# Patient Record
Sex: MALE | Race: WHITE | NOT HISPANIC OR LATINO | Employment: FULL TIME | URBAN - METROPOLITAN AREA
[De-identification: names, ages, dates, MRNs, and addresses within clinical notes are randomized per-mention and may not be internally consistent; named-entity substitution may affect disease eponyms.]

---

## 2017-01-19 ENCOUNTER — HOSPITAL ENCOUNTER (EMERGENCY)
Facility: MEDICAL CENTER | Age: 32
End: 2017-01-19
Attending: EMERGENCY MEDICINE

## 2017-01-19 ENCOUNTER — APPOINTMENT (OUTPATIENT)
Dept: RADIOLOGY | Facility: MEDICAL CENTER | Age: 32
End: 2017-01-19
Attending: EMERGENCY MEDICINE

## 2017-01-19 VITALS
RESPIRATION RATE: 16 BRPM | DIASTOLIC BLOOD PRESSURE: 74 MMHG | BODY MASS INDEX: 23.75 KG/M2 | TEMPERATURE: 99 F | SYSTOLIC BLOOD PRESSURE: 113 MMHG | WEIGHT: 179.23 LBS | OXYGEN SATURATION: 96 % | HEART RATE: 104 BPM | HEIGHT: 73 IN

## 2017-01-19 DIAGNOSIS — N45.1 EPIDIDYMITIS: ICD-10-CM

## 2017-01-19 LAB
ALBUMIN SERPL BCP-MCNC: 4 G/DL (ref 3.2–4.9)
ALBUMIN/GLOB SERPL: 1.4 G/DL
ALP SERPL-CCNC: 79 U/L (ref 30–99)
ALT SERPL-CCNC: 14 U/L (ref 2–50)
ANION GAP SERPL CALC-SCNC: 5 MMOL/L (ref 0–11.9)
APPEARANCE UR: ABNORMAL
AST SERPL-CCNC: 14 U/L (ref 12–45)
BASOPHILS # BLD AUTO: 0.4 % (ref 0–1.8)
BASOPHILS # BLD: 0.04 K/UL (ref 0–0.12)
BILIRUB SERPL-MCNC: 0.6 MG/DL (ref 0.1–1.5)
BUN SERPL-MCNC: 17 MG/DL (ref 8–22)
CALCIUM SERPL-MCNC: 9 MG/DL (ref 8.5–10.5)
CHLORIDE SERPL-SCNC: 104 MMOL/L (ref 96–112)
CO2 SERPL-SCNC: 26 MMOL/L (ref 20–33)
COLOR UR AUTO: YELLOW
CREAT SERPL-MCNC: 0.83 MG/DL (ref 0.5–1.4)
EOSINOPHIL # BLD AUTO: 0.16 K/UL (ref 0–0.51)
EOSINOPHIL NFR BLD: 1.6 % (ref 0–6.9)
ERYTHROCYTE [DISTWIDTH] IN BLOOD BY AUTOMATED COUNT: 41.9 FL (ref 35.9–50)
GFR SERPL CREATININE-BSD FRML MDRD: >60 ML/MIN/1.73 M 2
GLOBULIN SER CALC-MCNC: 2.9 G/DL (ref 1.9–3.5)
GLUCOSE SERPL-MCNC: 91 MG/DL (ref 65–99)
GLUCOSE UR QL STRIP.AUTO: NEGATIVE MG/DL
HCT VFR BLD AUTO: 43.6 % (ref 42–52)
HGB BLD-MCNC: 14.6 G/DL (ref 14–18)
IMM GRANULOCYTES # BLD AUTO: 0.03 K/UL (ref 0–0.11)
IMM GRANULOCYTES NFR BLD AUTO: 0.3 % (ref 0–0.9)
KETONES UR QL STRIP.AUTO: ABNORMAL MG/DL
LEUKOCYTE ESTERASE UR QL STRIP.AUTO: ABNORMAL
LIPASE SERPL-CCNC: 22 U/L (ref 11–82)
LYMPHOCYTES # BLD AUTO: 1.32 K/UL (ref 1–4.8)
LYMPHOCYTES NFR BLD: 13.1 % (ref 22–41)
MCH RBC QN AUTO: 30.2 PG (ref 27–33)
MCHC RBC AUTO-ENTMCNC: 33.5 G/DL (ref 33.7–35.3)
MCV RBC AUTO: 90.3 FL (ref 81.4–97.8)
MONOCYTES # BLD AUTO: 0.98 K/UL (ref 0–0.85)
MONOCYTES NFR BLD AUTO: 9.8 % (ref 0–13.4)
NEUTROPHILS # BLD AUTO: 7.52 K/UL (ref 1.82–7.42)
NEUTROPHILS NFR BLD: 74.8 % (ref 44–72)
NITRITE UR QL STRIP.AUTO: POSITIVE
NRBC # BLD AUTO: 0 K/UL
NRBC BLD AUTO-RTO: 0 /100 WBC
PH UR STRIP.AUTO: 7 [PH]
PLATELET # BLD AUTO: 240 K/UL (ref 164–446)
PMV BLD AUTO: 9.7 FL (ref 9–12.9)
POTASSIUM SERPL-SCNC: 4.2 MMOL/L (ref 3.6–5.5)
PROT SERPL-MCNC: 6.9 G/DL (ref 6–8.2)
PROT UR QL STRIP: ABNORMAL MG/DL
RBC # BLD AUTO: 4.83 M/UL (ref 4.7–6.1)
RBC UR QL AUTO: ABNORMAL
SODIUM SERPL-SCNC: 135 MMOL/L (ref 135–145)
SP GR UR: 1.02
WBC # BLD AUTO: 10.1 K/UL (ref 4.8–10.8)

## 2017-01-19 PROCEDURE — 700102 HCHG RX REV CODE 250 W/ 637 OVERRIDE(OP): Performed by: EMERGENCY MEDICINE

## 2017-01-19 PROCEDURE — 76870 US EXAM SCROTUM: CPT

## 2017-01-19 PROCEDURE — 81002 URINALYSIS NONAUTO W/O SCOPE: CPT

## 2017-01-19 PROCEDURE — 36415 COLL VENOUS BLD VENIPUNCTURE: CPT

## 2017-01-19 PROCEDURE — 700111 HCHG RX REV CODE 636 W/ 250 OVERRIDE (IP): Performed by: EMERGENCY MEDICINE

## 2017-01-19 PROCEDURE — 700101 HCHG RX REV CODE 250: Performed by: EMERGENCY MEDICINE

## 2017-01-19 PROCEDURE — 99284 EMERGENCY DEPT VISIT MOD MDM: CPT

## 2017-01-19 PROCEDURE — 85025 COMPLETE CBC W/AUTO DIFF WBC: CPT

## 2017-01-19 PROCEDURE — 87186 SC STD MICRODIL/AGAR DIL: CPT

## 2017-01-19 PROCEDURE — A9270 NON-COVERED ITEM OR SERVICE: HCPCS | Performed by: EMERGENCY MEDICINE

## 2017-01-19 PROCEDURE — 80053 COMPREHEN METABOLIC PANEL: CPT

## 2017-01-19 PROCEDURE — 83690 ASSAY OF LIPASE: CPT

## 2017-01-19 PROCEDURE — 87086 URINE CULTURE/COLONY COUNT: CPT

## 2017-01-19 PROCEDURE — 96372 THER/PROPH/DIAG INJ SC/IM: CPT

## 2017-01-19 PROCEDURE — 87077 CULTURE AEROBIC IDENTIFY: CPT

## 2017-01-19 RX ORDER — HYDROCODONE BITARTRATE AND ACETAMINOPHEN 7.5; 325 MG/1; MG/1
1 TABLET ORAL EVERY 6 HOURS PRN
Qty: 20 TAB | Refills: 0 | Status: SHIPPED | OUTPATIENT
Start: 2017-01-19

## 2017-01-19 RX ORDER — DOXYCYCLINE 100 MG/1
100 TABLET ORAL ONCE
Status: COMPLETED | OUTPATIENT
Start: 2017-01-19 | End: 2017-01-19

## 2017-01-19 RX ORDER — CEFTRIAXONE SODIUM 250 MG/1
250 INJECTION, POWDER, FOR SOLUTION INTRAMUSCULAR; INTRAVENOUS ONCE
Status: COMPLETED | OUTPATIENT
Start: 2017-01-19 | End: 2017-01-19

## 2017-01-19 RX ORDER — IBUPROFEN 600 MG/1
600 TABLET ORAL ONCE
Status: COMPLETED | OUTPATIENT
Start: 2017-01-19 | End: 2017-01-19

## 2017-01-19 RX ORDER — HYDROCODONE BITARTRATE AND ACETAMINOPHEN 7.5; 325 MG/1; MG/1
1 TABLET ORAL ONCE
Status: COMPLETED | OUTPATIENT
Start: 2017-01-19 | End: 2017-01-19

## 2017-01-19 RX ORDER — DOXYCYCLINE HYCLATE 100 MG
100 TABLET ORAL 2 TIMES DAILY
Qty: 20 TAB | Refills: 0 | Status: SHIPPED | OUTPATIENT
Start: 2017-01-19

## 2017-01-19 RX ORDER — LIDOCAINE HYDROCHLORIDE 10 MG/ML
1 INJECTION, SOLUTION INFILTRATION; PERINEURAL ONCE
Status: COMPLETED | OUTPATIENT
Start: 2017-01-19 | End: 2017-01-19

## 2017-01-19 RX ADMIN — IBUPROFEN 600 MG: 600 TABLET, FILM COATED ORAL at 16:57

## 2017-01-19 RX ADMIN — DOXYCYCLINE 100 MG: 100 TABLET ORAL at 16:57

## 2017-01-19 RX ADMIN — HYDROCODONE BITARTRATE AND ACETAMINOPHEN 1 TABLET: 7.5; 325 TABLET ORAL at 16:57

## 2017-01-19 RX ADMIN — LIDOCAINE HYDROCHLORIDE 1 ML: 10 INJECTION, SOLUTION INFILTRATION; PERINEURAL at 18:16

## 2017-01-19 RX ADMIN — CEFTRIAXONE SODIUM 250 MG: 250 INJECTION, POWDER, FOR SOLUTION INTRAMUSCULAR; INTRAVENOUS at 18:16

## 2017-01-19 ASSESSMENT — PAIN SCALES - GENERAL
PAINLEVEL_OUTOF10: 8
PAINLEVEL_OUTOF10: 5
PAINLEVEL_OUTOF10: 3

## 2017-01-19 NOTE — ED AVS SNAPSHOT
Becual Access Code: [email or web address]  Expires: 2/18/2017  7:19 PM    Your email address is not on file at [facility].  Email Addresses are required for you to sign up for Becual, please contact [phone] to verify your personal information and to provide your email address prior to attempting to register for Becual.    Belgica Norton  [address] [place] [id number]    Becual  A secure, online tool to manage your health information     [facility]’s Becual® is a secure, online tool that connects you to your personalized health information from the privacy of your home -- day or night - making it very easy for you to manage your healthcare. Once the activation process is completed, you can even access your medical information using the Becual jacklyn, which is available for free in the Apple Jacklyn store or Google Play store.     To learn more about Becual, visit [email or web address]    There are two levels of access available (as shown below):   My Chart Features  [facility] Primary Care Doctor [facility]  Specialists [facility]  Urgent  Care Non-[facility] Primary Care Doctor   Email your healthcare team securely and privately 24/7 X X X    Manage appointments: schedule your next appointment; view details of past/upcoming appointments X      Request prescription refills. X      View recent personal medical records, including lab and immunizations X X X X   View health record, including health history, allergies, medications X X X X   Read reports about your outpatient visits, procedures, consult and ER notes X X X X   See your discharge summary, which is a recap of your hospital and/or ER visit that includes your diagnosis, lab results, and care plan X X  X     How to register for Becual:  Once your e-mail address has been verified, follow the following steps to sign up for Becual.     1. Go to  [email or web address]  2. Click on the Sign Up Now box, which takes you to the New Member Sign Up page. You  will need to provide the following information:  a. Enter your Dekko Access Code exactly as it appears at the top of this page. (You will not need to use this code after you’ve completed the sign-up process. If you do not sign up before the expiration date, you must request a new code.)   b. Enter your date of birth.   c. Enter your home email address.   d. Click Submit, and follow the next screen’s instructions.  3. Create a HealthcareSourcet ID. This will be your Dekko login ID and cannot be changed, so think of one that is secure and easy to remember.  4. Create a Dekko password. You can change your password at any time.  5. Enter your Password Reset Question and Answer. This can be used at a later time if you forget your password.   6. Enter your e-mail address. This allows you to receive e-mail notifications when new information is available in Dekko.  7. Click Sign Up. You can now view your health information.    For assistance activating your Dekko account, call [phone]

## 2017-01-19 NOTE — ED AVS SNAPSHOT
After Visit Summary                                                                                                                Belgica Norton   MRN: [id number]    Department:  [facility], Emergency Dept   Date of Visit:  1/19/2017            [facility], Emergency Dept    [address]    [place] [address]    Phone:  [phone]      You were seen by     1. José Miguel Hodges M.D.    2. Jermaine Slater M.D.      Your Diagnosis Was     Epididymitis     N45.1       These are the medications you received during your hospitalization from 01/19/2017 1508 to 01/19/2017 1919     Date/Time Order Dose Route Action    01/19/2017 1657 doxycycline monohydrate (ADOXA) tablet 100 mg 100 mg Oral Given    01/19/2017 1657 hydrocodone-acetaminophen (NORCO) 7.5-325 MG per tablet 1 Tab 1 Tab Oral Given    01/19/2017 1657 ibuprofen (MOTRIN) tablet 600 mg 600 mg Oral Given    01/19/2017 1816 cefTRIAXone (ROCEPHIN) injection 250 mg 250 mg Intramuscular Given    01/19/2017 1816 lidocaine (XYLOCAINE) 1 % injection 1 mL Other Given      Follow-up Information     1. Call Romeo Guzman M.D..    Specialty:  Urology    Why:  As needed    Contact information    [address]  [facility] [address] [phone]        Medication Information     Review all of your home medications and newly ordered medications with your primary doctor and/or pharmacist as soon as possible. Follow medication instructions as directed by your doctor and/or pharmacist.     Please keep your complete medication list with you and share with your physician. Update the information when medications are discontinued, doses are changed, or new medications (including over-the-counter products) are added; and carry medication information at all times in the event of emergency situations.               Medication List      START taking these medications        Instructions    doxycycline 100 MG Tabs   Commonly known as:   VIBRAMYCIN    Take 1 Tab by mouth 2 times a day.   Dose:  100 mg       hydrocodone-acetaminophen 7.5-325 MG per tablet   Commonly known as:  NORCO    Take 1 Tab by mouth every 6 hours as needed.   Dose:  1 Tab               Procedures and tests performed during your visit     CBC WITH DIFFERENTIAL    COMP METABOLIC PANEL    ESTIMATED GFR    LIPASE    POC UA    URINE CULTURE(NEW)    [id number]        Discharge Instructions       Epididymitis  Epididymitis is swelling (inflammation) of the epididymis. The epididymis is a cord-like structure that is located along the back part of the testicle. Epididymitis is usually, but not always, caused by infection. This is usually a sudden problem that begins with chills, fever, and pain behind the scrotum and in the testicle. There may be swelling and redness of the testicle.  CAUSES  · STDs (sexually transmitted diseases).  ¨ Gonorrhea.  ¨ Chlamydia.  · Other contagious diseases, such as tuberculosis.  · Bladder outlet obstruction.  RISK FACTORS  · Having unprotected sex.  · Having anal sex.  · Having had a prostate biopsy.  · Having had an instrument inserted into the urinary tract, such as a urinary catheter.  · Having a suppressed immune system.  DIAGNOSIS  Your health care provider may use any of the following methods to diagnose epididymitis:  · A physical exam.  · An ultrasound-guided biopsy.  · A urine test for infections, such as STDs.  Your health care provider may test you for other STDs, including HIV (human immunodeficiency virus).  TREATMENT  Antibiotic medicine may be prescribed for epididymitis that is caused by an infection. Severe cases may require surgery.  HOME CARE INSTRUCTIONS  · To help relieve pain, take hot sitz baths for 20 minutes, 4 times per day.  · If your epididymitis was caused by an STD, avoid sexual activity until your treatment is complete.  · If you test positive for an STD, inform your sexual partners. They may need to be  treated.  · Take medicines only as directed by your health care provider. These include over-the-counter medicines and prescription medicines for pain, discomfort, or fever.  · Take your antibiotic medicine as directed by your health care provider. Finish the antibiotic even if you start to feel better.  · Keep all follow-up visits as directed by your health care provider. This is important.  SEEK IMMEDIATE MEDICAL CARE IF:  · You have a fever.  · Your pain medicine is not helping.  · Your pain is getting worse.  · Your pain seems to come and go.  · You develop pain, redness, and swelling in the scrotum or the areas around it.     This information is not intended to replace advice given to you by your health care provider. Make sure you discuss any questions you have with your health care provider.     Document Released: 12/15/2001 Document Revised: 01/08/2016 Document Reviewed: 11/04/2010  Guavus Interactive Patient Education ©2016 [employer]            Patient Information     Patient Information    Following emergency treatment: all patient requiring follow-up care must return either to a private physician or a clinic if your condition worsens before you are able to obtain further medical attention, please return to the emergency room.     Billing Information    At [facility], we work to make the billing process streamlined for our patients.  Our Representatives are here to answer any questions you may have regarding your hospital bill.  If you have insurance coverage and have supplied your insurance information to us, we will submit a claim to your insurer on your behalf.  Should you have any questions regarding your bill, we can be reached online or by phone as follows:  Online: You are able pay your bills online or live chat with our representatives about any billing questions you may have. We are here to help Monday - Friday from 8:00am to 7:30pm and 9:00am - 12:00pm on Saturdays.  Please visit  [email or web address]  for more information.   Phone:  [phone] or [phone]    Please note that your emergency physician, surgeon, pathologist, radiologist, anesthesiologist, and other specialists are not employed by [facility] and will therefore bill separately for their services.  Please contact them directly for any questions concerning their bills at the numbers below:     Emergency Physician Services:  [phone]  [facility]:  [phone]  [facility]:  [phone]  [facility]:  [phone]    1. Your final bill may vary from the amount quoted upon discharge if all procedures are not complete at that time, or if your doctor has additional procedures of which we are not aware. You will receive an additional bill if you return to the Emergency Department at [facility] for suture removal regardless of the facility of which the sutures were placed.     2. Please arrange for settlement of this account at the emergency registration.    3. All self-pay accounts are due in full at the time of treatment.  If you are unable to meet this obligation then payment is expected within 4-5 days.     4. If you have had radiology studies (CT, X-ray, Ultrasound, MRI), you have received a preliminary result during your emergency department visit. Please contact the radiology department [phone] to receive a copy of your final result. Please discuss the Final result with your primary physician or with the follow up physician provided.     Crisis Hotline:  [place] Crisis Hotline:  4-377-YIOQLIA or 1-506.315.2713  [place] Crisis Hotline:    1-104.413.1887 or 160-451-1071         ED Discharge Follow Up Questions    1. In order to provide you with very good care, we would like to follow up with a phone call in the next few days.  May we have your permission to contact you?     YES /  NO    2. What is the best phone number to call  you? (       )_____-__________    3. What is the best time to call you?      Morning  /  Afternoon  /  Evening                   Patient Signature:  ____________________________________________________________    Date:  ____________________________________________________________

## 2017-01-19 NOTE — ED AVS SNAPSHOT
1/19/2017          Belgica Norton  [address] [place] [address]    Dear Belgica:    [facility] wants to ensure your discharge home is safe and you or your loved ones have had all your questions answered regarding your care after you leave the hospital.    You may receive a telephone call within two days of your discharge.  This call is to make certain you understand your discharge instructions as well as ensure we provided you with the best care possible during your stay with us.     The call will only last approximately 3-5 minutes and will be done by a nurse.    Once again, we want to ensure your discharge home is safe and that you have a clear understanding of any next steps in your care.  If you have any questions or concerns, please do not hesitate to contact us, we are here for you.  Thank you for choosing [facility] for your healthcare needs.    Sincerely,    Adonay Espino    [facility]

## 2017-01-19 NOTE — LETTER
1/24/2017               Ginaluzmaria NATALI Norton  [address] [place] [address]        Dear Belgica (MR#[id number])    As we have been unable to contact you by phone, this letter is sent in regards to your recent visit to the [facility] Emergency Department on 1/19/2017.  During the visit, tests were performed to assist the physician in a medical diagnosis.  A review of those tests requires that we notify you of the following:    Your urine culture and sensitivity was POSITIVE for a bacteria called Escherichia coli, and further treatment may be necessary. The currently prescribed antibiotic (doxycycline) may not be effective in treating your infection. IF YOU ARE NOT FEELING BETTER PLEASE CONTACT ME AS SOON AS POSSIBLE AT THE NUMBER BELOW.        Please feel free to contact me at the number below if you have any questions or concerns. Thank you for your cooperation in the matter.    Sincerely,  ED Culture Follow-Up Staff  Kelsey Harper, PharmD    [facility], Emergency Department  [address] [place] [address]  [phone] (ED Culture Line)  [phone]

## 2017-01-19 NOTE — ED NOTES
"PT ambulated to triage c/o rt testicle pain, painful urination, and flank pain x 3 days  Chief Complaint   Patient presents with   • Testicular Pain   • Flank Pain     rt   • Urinary Pain     Blood pressure 119/72, pulse 103, temperature 36 °C (96.8 °F), temperature source Temporal, resp. rate 16, height 1.854 m (6' 1\"), weight 81.3 kg (179 lb 3.7 oz), SpO2 100 %.    "

## 2017-01-19 NOTE — ED NOTES
Abdominal pain protocol ordered.  Blood drawn and sent to lab.  Urine specimen cup provided.  Pt educated on clean catch technique.  Pt updated on wait time and escorted back to lobby.

## 2017-01-20 NOTE — ED NOTES
Pt discharge home. Pt given discharge instructions and prescription. Pt verbalized understanding, all questions answered ,vss upon d/c. Pt steady on feet upon discharge

## 2017-01-20 NOTE — DISCHARGE INSTRUCTIONS
Epididymitis  Epididymitis is swelling (inflammation) of the epididymis. The epididymis is a cord-like structure that is located along the back part of the testicle. Epididymitis is usually, but not always, caused by infection. This is usually a sudden problem that begins with chills, fever, and pain behind the scrotum and in the testicle. There may be swelling and redness of the testicle.  CAUSES  · STDs (sexually transmitted diseases).  ¨ Gonorrhea.  ¨ Chlamydia.  · Other contagious diseases, such as tuberculosis.  · Bladder outlet obstruction.  RISK FACTORS  · Having unprotected sex.  · Having anal sex.  · Having had a prostate biopsy.  · Having had an instrument inserted into the urinary tract, such as a urinary catheter.  · Having a suppressed immune system.  DIAGNOSIS  Your health care provider may use any of the following methods to diagnose epididymitis:  · A physical exam.  · An ultrasound-guided biopsy.  · A urine test for infections, such as STDs.  Your health care provider may test you for other STDs, including HIV (human immunodeficiency virus).  TREATMENT  Antibiotic medicine may be prescribed for epididymitis that is caused by an infection. Severe cases may require surgery.  HOME CARE INSTRUCTIONS  · To help relieve pain, take hot sitz baths for 20 minutes, 4 times per day.  · If your epididymitis was caused by an STD, avoid sexual activity until your treatment is complete.  · If you test positive for an STD, inform your sexual partners. They may need to be treated.  · Take medicines only as directed by your health care provider. These include over-the-counter medicines and prescription medicines for pain, discomfort, or fever.  · Take your antibiotic medicine as directed by your health care provider. Finish the antibiotic even if you start to feel better.  · Keep all follow-up visits as directed by your health care provider. This is important.  SEEK IMMEDIATE MEDICAL CARE IF:  · You have a  fever.  · Your pain medicine is not helping.  · Your pain is getting worse.  · Your pain seems to come and go.  · You develop pain, redness, and swelling in the scrotum or the areas around it.     This information is not intended to replace advice given to you by your health care provider. Make sure you discuss any questions you have with your health care provider.     Document Released: 12/15/2001 Document Revised: 01/08/2016 Document Reviewed: 11/04/2010  Elsevier Interactive Patient Education ©2016 Elsevier Inc.

## 2017-01-20 NOTE — ED PROVIDER NOTES
"ED Provider Note    Scribed for José Miguel Hodges M.D. by Romario Shin. 1/19/2017  4:44 PM    Primary care provider: None  Means of arrival: Walk-in  History obtained from: Patient  History limited by: None    CHIEF COMPLAINT  Chief Complaint   Patient presents with   • Testicular Pain   • Flank Pain     rt   • Urinary Pain       HPI  Belgica Norton is a 31 y.o. male who presents to the Emergency Department testicular pain, flank pain, and urinary pain for 4-5 days. Patient reports his first symptoms was flank pain, and he then developed frequent urination with urgency. He has also had painful urination and lower abdominal pain. Patient is also having right testicle pain and and swelling. He denies fever, rash, vomiting, diarrhea, or other symptoms.     REVIEW OF SYSTEMS  Pertinent negatives include no fever, rash, vomiting, diarrhea. As above, all other systems reviewed and are negative.   See HPI for further details.     PAST MEDICAL HISTORY   No diabetes    SURGICAL HISTORY  patient denies any surgical history    SOCIAL HISTORY     History   Drug Use No       FAMILY HISTORY  No contributing family history noted.     CURRENT MEDICATIONS  Reviewed.  See Encounter Summary.     ALLERGIES  No Known Allergies    PHYSICAL EXAM  VITAL SIGNS: /72 mmHg  Pulse 103  Temp(Src) 36 °C (96.8 °F) (Temporal)  Resp 16  Ht 1.854 m (6' 1\")  Wt 81.3 kg (179 lb 3.7 oz)  BMI 23.65 kg/m2  SpO2 100%  Constitutional: Well developed, Well nourished, Appears very uncomfortable.   HENT: Normocephalic, Atraumatic, Bilateral external ears normal, Oropharynx is clear mucous membranes are moist. No oral exudates or nasal discharge.   Eyes: Pupils are equal round and reactive, EOMI, Conjunctiva normal, No discharge.   Neck: Normal range of motion, No tenderness, Supple, No stridor. No meningismus.  Lymphatic: No lymphadenopathy noted.   Abdomen: Tenderness to right lower abdomen and groin. Soft non-distended. There is no rebound or " guarding. No organomegaly is appreciated. Bowel sounds are normal.  Skin: Normal without rash.   Back: No CVA or spinal tenderness.  : Tenderness to superior aspect of right testicle, which has an abnormal lie. Scrotum is normal, penis is normal.   Neurologic: Alert & oriented x 3, Normal motor function, Normal sensory function, No focal deficits noted. Reflexes are normal.  Psychiatric: Affect normal, Judgment normal, Mood normal.    DIAGNOSTIC STUDIES / PROCEDURES    LABS  Labs Reviewed   CBC WITH DIFFERENTIAL - Abnormal; Notable for the following:     MCHC 33.5 (*)     Neutrophils-Polys 74.80 (*)     Lymphocytes 13.10 (*)     Neutrophils (Absolute) 7.52 (*)     Monos (Absolute) 0.98 (*)     All other components within normal limits   POC UA - Abnormal; Notable for the following:     POC Appearance Cloudy (*)     POC Ketones Trace (*)     POC Blood Trace-intact (*)     POC Protein Trace (*)     POC Nitrites Positive (*)     POC Leukocyte Esterase Moderate (*)     All other components within normal limits   COMP METABOLIC PANEL   LIPASE   ESTIMATED GFR   URINE CULTURE(NEW)    Narrative:     Indication for culture:->Emergency Room Patient   POC URINALYSIS      All labs reviewed by me.    RADIOLOGY  [id number]    (Results Pending)     The radiologist's interpretation of all radiological studies have been reviewed by me.    COURSE & MEDICAL DECISION MAKING  Nursing notes, VS, PMSFHx reviewed in chart.    4:44 PM Patient seen and examined at bedside. We discussed his symptoms and exam indicate epididymitis and that I will order labs and an ultrasound to evaluate. He was made aware I will treat him with antibiotics and treat his pain. Ordered for US-scrotum and labs to evaluate. Patient was treated with Adoxa 100 mg PO, Norco 7.5-325 mg PO, Motrin 600 mg PO for his symptoms.      Urine shows trace blood, positive nitrites, moderate leukocyte esterase, labs show no leukocytosis or significant shift and normal  electrolytes and renal function.    Patient was sent for ultrasound which is pending. I have a high suspicion for epididymitis and a lower suspicion for torsion, mass. Abdominal examination continues to be unremarkable. At this point given my high suspicion given intramuscular Rocephin and in addition to doxycycline is given a prescription for 10 days of doxycycline for treatment empirically for chlamydia    Patient has had high blood pressure while in the emergency department, felt likely secondary to medical condition. Counseled patient to monitor blood pressure at home and follow up with primary care physician.     I have signed into and reviewed the patient's prescription monitoring program data prior to prescribing a scheduled drug. The patient will not drink alcohol nor drive with prescribed medications. The patient will return for new or worsening symptoms and is stable at the time of discharge.    DISPOSITION:  Patient will be discharged home in stable condition.    FINAL IMPRESSION  1. Epididymitis          Romario IGNACIO (Scribe), am scribing for, and in the presence of, José Miguel Hodges M.D..    Electronically signed by: Romario Shin (Scribe), 1/19/2017    José Miguel IGNACIO M.D. personally performed the services described in this documentation, as scribed by Romario Shin in my presence, and it is both accurate and complete.    The note accurately reflects work and decisions made by me.  José Miguel Hodges  1/19/2017  6:00 PM

## 2017-01-21 LAB
BACTERIA UR CULT: ABNORMAL
BACTERIA UR CULT: ABNORMAL
SIGNIFICANT IND 70042: ABNORMAL
SITE SITE: ABNORMAL
SOURCE SOURCE: ABNORMAL

## 2017-01-24 NOTE — ED NOTES
ED Positive Culture Follow-up/Notification Note:    Date: 1/24/17     Patient seen in the ED on 1/19/2017 for testicular pain, flank pain and urinary pain x 4-5 days..   1. Epididymitis       Discharge Medication List as of 1/19/2017  7:19 PM      START taking these medications    Details   doxycycline (VIBRAMYCIN) 100 MG Tab Take 1 Tab by mouth 2 times a day., Disp-20 Tab, R-0, Print Rx Paper      hydrocodone-acetaminophen (NORCO) 7.5-325 MG per tablet Take 1 Tab by mouth every 6 hours as needed., Disp-20 Tab, R-0, Print Rx Paper             Allergies: Review of patient's allergies indicates no known allergies.     Final cultures:   Results     Procedure Component Value Units Date/Time    URINE CULTURE(NEW) [[id number]]  (Abnormal)  (Susceptibility) Collected:  01/19/17 1648    Order Status:  Completed Specimen Information:  Urine Updated:  01/24/17 0957     Significant Indicator POS (POS)      Source UR      Site --      Urine Culture -- (A)      Urine Culture -- (A)      Result:        Escherichia coli  >100,000 cfu/mL      Narrative:      Indication for culture:->Emergency Room Patient    Culture & Susceptibility     ESCHERICHIA COLI     Antibiotic Sensitivity Microscan Unit Status    Ampicillin Sensitive <=8 mcg/mL Final    Cefepime Sensitive <=8 mcg/mL Final    Cefotaxime Sensitive <=2 mcg/mL Final    Cefotetan Sensitive <=16 mcg/mL Final    Ceftazidime Sensitive <=1 mcg/mL Final    Ceftriaxone Sensitive <=8 mcg/mL Final    Cefuroxime Sensitive <=4 mcg/mL Final    Cephalothin Sensitive <=8 mcg/mL Final    Ciprofloxacin Sensitive <=1 mcg/mL Final    Gentamicin Sensitive <=4 mcg/mL Final    Levofloxacin Sensitive <=2 mcg/mL Final    Nitrofurantoin Sensitive <=32 mcg/mL Final    Pip/Tazobactam Sensitive <=16 mcg/mL Final    Piperacillin Sensitive <=16 mcg/mL Final    Tetracycline Resistant >8 mcg/mL Final    Tigecycline Sensitive <=2 mcg/mL Final    Tobramycin Sensitive <=4 mcg/mL Final    Trimeth/Sulfa Sensitive  <=2/38 mcg/mL Final                             Plan:   E. Coli in the urine is resistant to tetracyclines. No STI testing reported so would continue doxycycline for potential epididymitis from Chlamydia. However, patient likely needs alternative treatment for UTI.   Attempted to contact patient at the number listed but the phone number had been disconnected.   Will send a letter to notify of result and encourage f/u if symptoms have not improved.     Kelsey Harper